# Patient Record
Sex: FEMALE | Race: WHITE | Employment: FULL TIME | URBAN - METROPOLITAN AREA
[De-identification: names, ages, dates, MRNs, and addresses within clinical notes are randomized per-mention and may not be internally consistent; named-entity substitution may affect disease eponyms.]

---

## 2021-04-12 ENCOUNTER — APPOINTMENT (OUTPATIENT)
Dept: GENERAL RADIOLOGY | Age: 36
End: 2021-04-12

## 2021-04-12 ENCOUNTER — HOSPITAL ENCOUNTER (EMERGENCY)
Age: 36
Discharge: HOME | End: 2021-04-12

## 2021-04-12 VITALS
SYSTOLIC BLOOD PRESSURE: 115 MMHG | DIASTOLIC BLOOD PRESSURE: 74 MMHG | OXYGEN SATURATION: 100 % | BODY MASS INDEX: 27.31 KG/M2 | HEIGHT: 64 IN | RESPIRATION RATE: 14 BRPM | HEART RATE: 76 BPM | TEMPERATURE: 97.2 F | WEIGHT: 160 LBS

## 2021-04-12 DIAGNOSIS — M25.471 EFFUSION OF RIGHT ANKLE: ICD-10-CM

## 2021-04-12 DIAGNOSIS — S93.401A SPRAIN OF RIGHT ANKLE, UNSPECIFIED LIGAMENT, INITIAL ENCOUNTER: Primary | ICD-10-CM

## 2021-04-12 PROCEDURE — 99283 EMERGENCY DEPT VISIT LOW MDM: CPT

## 2021-04-12 PROCEDURE — 73590 X-RAY EXAM OF LOWER LEG: CPT

## 2021-04-12 PROCEDURE — 73610 X-RAY EXAM OF ANKLE: CPT

## 2021-04-12 ASSESSMENT — PAIN DESCRIPTION - LOCATION: LOCATION: ANKLE

## 2021-04-12 ASSESSMENT — PAIN DESCRIPTION - DESCRIPTORS: DESCRIPTORS: THROBBING

## 2021-04-12 ASSESSMENT — PAIN DESCRIPTION - PROGRESSION: CLINICAL_PROGRESSION: GRADUALLY WORSENING

## 2021-04-12 ASSESSMENT — PAIN DESCRIPTION - FREQUENCY: FREQUENCY: CONTINUOUS

## 2021-04-12 ASSESSMENT — PAIN DESCRIPTION - PAIN TYPE: TYPE: ACUTE PAIN

## 2021-04-12 NOTE — ED TRIAGE NOTES
FIRST PROVIDER CONTACT ASSESSMENT NOTE      Department of Emergency Medicine   4/12/21  7:15 PM EDT    Chief Complaint: No chief complaint on file. History of Present Illness:    Marino Rivas is a 39 y.o. female who presents to the ED by private car for right ankle pain after injury today    Focused Screening Exam:  Constitutional:  Alert, appears stated age and is in no distress. *ALLERGIES*     Patient has no allergy information on record.      ED Triage Vitals   BP Temp Temp src Pulse Resp SpO2 Height Weight   -- -- -- -- -- -- -- --        Initial Plan of Care:  Initiate Treatment-Testing, Proceed toTreatment Area When Bed Available for ED Attending/MLP to Continue Care    -----------------END OF FIRST PROVIDER CONTACT ASSESSMENT NOTE--------------  Electronically signed by Padmini Zayas PA-C   DD: 4/12/21

## 2021-04-12 NOTE — ED PROVIDER NOTES
[facility]  Department of Emergency Medicine   ED  Encounter Note  Admit Date/RoomTime: 2021  7:44 PM  ED Room: 37/37    NAME: Chidi Santo  : 1985  MRN: [id number]     Chief Complaint:  Ankle Pain (Swelling; d/t Fall/Injury into [place] )    History of Present Illness         Heather Green is a 39 y.o. old female presenting to the emergency department by private vehicle, for traumatic Right ankle pain which occured a few hour(s) prior to arrival.  The complaint is due to was getting out of her car and stepped into a pothole and rolled her ankle. Since onset the symptoms have been remaining constant with ability to bear weight, but with some pain. Patient has no prior history of pain/injury with regards to today's visit. Her pain is aggraveated by any movement, any use of or pressure on or palpation of and relieved by nothing, as no treatment has been provided prior to this visit. She denies any head injury, headache, loss of consciousness, confusion, dizziness, neck pain, chest pain, abdominal pain, back pain, numbness, weakness, blurred vision, nausea or vomiting. ROS   Pertinent positives and negatives are stated within HPI, all other systems reviewed and are negative. Past Medical History:  has no past medical history on file. Surgical History:  has no past surgical history on file. Social History:  reports that she has been smoking. She has been smoking about 0.50 packs per day. She has never used smokeless tobacco. She reports previous alcohol use. She reports that she does not use drugs. Family History: family history is not on file. Allergies: Patient has no known allergies.     Physical Exam   Oxygen Saturation Interpretation: Normal.        ED Triage Vitals [21]   BP Temp Temp Source Pulse Resp SpO2 Height Weight   115/74 97.2 °F (36.2 °C) Temporal 76 14 100 % 5' 4\" (1.626 m) 160 lb (72.6 kg) Constitutional:  Alert, development consistent with age. Neck:  Normal ROM. Supple. Ankle:  Right Lateral:              Tenderness:  moderate. Swelling: Moderate. Deformity: no.             ROM: full range with pain. Skin:  normal exam; no wounds, erythema. Neurovascular: Motor deficit: none. Sensory deficit:   none. Pulse deficit: none. Capillary refill: normal.  Knee:              Tenderness:  none. Swelling: None. Deformity: no.             ROM: full range of motion. Skin:  normal exam; no wounds, erythema, or swelling. Foot:              Tenderness:  none. Swelling: None. Deformity: no.             ROM: full range of motion. Skin:  normal exam; no wounds, erythema, or swelling.}. Gait:  unable to test due patient condition. Lymphatics: No lymphangitis or adenopathy noted. Neurological:  Oriented. Motor functions intact. Lab / Imaging Results   (All laboratory and radiology results have been personally reviewed by myself)  Labs:  No results found for this visit on 04/12/21. Imaging: All Radiology results interpreted by Radiologist unless otherwise noted. XR ANKLE RIGHT (MIN 3 VIEWS)   Final Result   Mild lateral malleolar soft tissue swelling and small joint effusion. No   acute osseous findings in the right ankle nor in the right tibia/fibula on   these exams. RECOMMENDATION:   In the setting of trauma, if there is persistent symptoms and physical exam   warrants a repeat radiograph in 10-14 days could be considered as occult   fractures may not be evident on initial imaging evaluation. XR TIBIA FIBULA RIGHT (2 VIEWS)   Final Result   Mild lateral malleolar soft tissue swelling and small joint effusion. No   acute osseous findings in the right ankle nor in the right tibia/fibula on   these exams. RECOMMENDATION:   In the setting of trauma, if there is persistent symptoms and physical exam   warrants a repeat radiograph in 10-14 days could be considered as occult   fractures may not be evident on initial imaging evaluation. ED Course / Medical Decision Making   Medications - No data to display     Consults:   None    Procedure(s):   none    MDM:     Patient presents to the ED for rolling her ankle when getting out of her car and twisting in a pothole. Differential diagnoses included but not limited to fracture versus dislocation versus sprain. Workup in the ED revealed x-ray of the right ankle reveals mild lateral malleolus soft tissue swelling and small joint effusion. No acute osseous findings in the right ankle nor in the right tib-fib on these exams. There was no neurological compromise. Patient had mild lateral aspect tenderness and swelling\. Patient was given ice for their symptoms with mild improvement. She denies any other injury or trauma. Patient continues to be non-toxic on re-evaluation. Findings were discussed with the patient and reasons to immediately return to the ED were articulated to them. They will follow-up with their PMD and orthopedics. Instructed patient to remain nonweightbearing until follow-up with orthopedics. Discussed the rice technique. Discussed with patient increased risk for falls and using crutches. She states verbal understanding. Patient also placed in Ace wrap and ankle brace for support. Plan of Care/Counseling:  I reviewed today's visit with the patient in addition to providing specific details for the plan of care and counseling regarding the diagnosis and prognosis. Questions are answered at this time and are agreeable with the plan. Assessment      1. Sprain of right ankle, unspecified ligament, initial encounter    2. Effusion of right ankle      Plan   Discharge home.   Patient condition is stable    New Medications     New